# Patient Record
Sex: MALE | Race: WHITE | NOT HISPANIC OR LATINO | Employment: FULL TIME | URBAN - METROPOLITAN AREA
[De-identification: names, ages, dates, MRNs, and addresses within clinical notes are randomized per-mention and may not be internally consistent; named-entity substitution may affect disease eponyms.]

---

## 2023-08-30 ENCOUNTER — HOSPITAL ENCOUNTER (OUTPATIENT)
Dept: DATA CONVERSION | Facility: HOSPITAL | Age: 68
End: 2023-08-30
Attending: ORTHOPAEDIC SURGERY | Admitting: ORTHOPAEDIC SURGERY

## 2023-08-30 DIAGNOSIS — M25.742 OSTEOPHYTE, LEFT HAND: ICD-10-CM

## 2023-08-30 DIAGNOSIS — M67.442 GANGLION, LEFT HAND: ICD-10-CM

## 2023-08-30 DIAGNOSIS — M67.449 GANGLION, UNSPECIFIED HAND: ICD-10-CM

## 2023-09-13 LAB
COMPLETE PATHOLOGY REPORT: NORMAL
CONVERTED CLINICAL DIAGNOSIS-HISTORY: NORMAL
CONVERTED FINAL DIAGNOSIS: NORMAL
CONVERTED FINAL REPORT PDF LINK TO COPY AND PASTE: NORMAL
CONVERTED GROSS DESCRIPTION: NORMAL

## 2023-09-29 VITALS — HEIGHT: 61 IN | WEIGHT: 229.28 LBS | BODY MASS INDEX: 43.29 KG/M2

## 2023-09-30 NOTE — H&P
History & Physical Reviewed:   I have reviewed the History and Physical dated:  21-Aug-2023   History and Physical reviewed and relevant findings noted. Patient examined to review pertinent physical  findings.: No significant changes   Home Medications Reviewed: no changes noted   Allergies Reviewed: no changes noted       ERAS (Enhanced Recovery After Surgery):  ·  ERAS Patient: no     Consent:   COVID-19 Consent:  ·  COVID-19 Risk Consent Surgeon has reviewed key risks related to the risk of philipp COVID-19 and if they contract COVID-19 what the risks are.       Electronic Signatures:  Yves Sanchez)  (Signed 30-Aug-2023 06:52)   Authored: History & Physical Reviewed, ERAS, Consent,  Note Completion      Last Updated: 30-Aug-2023 06:52 by Yves Sanchez)

## 2023-10-01 NOTE — OP NOTE
Post Operative Note:     PreOp Diagnosis: Left index finger mucous cyst   Post-Procedure Diagnosis: Left index finger mucous  cyst   Procedure: Left index finger mucous cyst excision   Surgeon: Daniel   Resident/Fellow/Other Assistant: Kt   Anesthesia: Local   Estimated Blood Loss (mL): Minimal   Specimen: yes. Left index finger mucous cyst   Findings: See below   Patient Returned To/Condition: PACU/good     Operative Report Dictated:  Dictation: not applicable - note contains Operative  Report   Operative Report:    Indications: Patient with left index finger mucous cyst and intermittent drainage.  We discussed risks and benefits of nonoperative versus operative treatment options  and after thoroughly reviewing patient wished to proceed with surgical excision.  We discussed potential for recurrence as well as possible nail deformity along with other risks.    Operative course: Patient was greeted in the preoperative holding area and the operative site was marked with indelible marker.  A digital block was performed after confirming patient identifiers and surgical site.  Block was performed with 4 mL of 1%  lidocaine with epinephrine and sodium bicarbonate using sterile technique.  Patient was brought back to the operating room suite where left upper extremity was prepped and draped in standard sterile fashion.  Timeout procedure was performed as per standard  protocol.  Digital turnicot was applied to the finger.  Longitudinal incision was made directly overlying the mucous cyst.  Dissection was carried down along the mucous cyst stalk which was continuous with the DIP joint.  While protecting the collateral  ligament as well as the terminal extensor tendon the cyst was sharply excised and passed off the field for pathology analysis.  Small osteophytes were debrided with rongeur and wound was then irrigated.  Wound was reapproximated with 4-0 Monocryl in a  simple interrupted fashion.  Dry sterile  dressings were applied and patient was taken to the recovery for further care.    He will follow-up in 2 weeks for wound check and pathology review.     Attestation:   Note Completion:  Attending Attestation I was present for the entire procedure         Electronic Signatures:  Yves Sanchez)  (Signed 30-Aug-2023 09:02)   Authored: Post Operative Note, Note Completion      Last Updated: 30-Aug-2023 09:02 by Yves Sanchez)

## 2024-05-07 ENCOUNTER — APPOINTMENT (OUTPATIENT)
Dept: PRIMARY CARE | Facility: CLINIC | Age: 69
End: 2024-05-07
Payer: COMMERCIAL

## 2025-02-20 ENCOUNTER — TELEMEDICINE (OUTPATIENT)
Dept: PRIMARY CARE | Facility: CLINIC | Age: 70
End: 2025-02-20
Payer: COMMERCIAL

## 2025-02-20 DIAGNOSIS — J40 BRONCHITIS: Primary | ICD-10-CM

## 2025-02-20 RX ORDER — ALBUTEROL SULFATE 90 UG/1
2 INHALANT RESPIRATORY (INHALATION) EVERY 4 HOURS PRN
Qty: 8 G | Refills: 0 | Status: SHIPPED | OUTPATIENT
Start: 2025-02-20 | End: 2026-02-20

## 2025-02-20 NOTE — PROGRESS NOTES
"Subjective   Patient ID: Stanislav Orellana is a 69 y.o. male who presents for Cough.  HPI  Stanislav presents via phone visit for cough  -He was seen at urgent care, put on azithromycin, prednisone, albuterol for bronchitis 2/10/25  -He feels 75% better  -States that he cannot hold breath without coughing, coughing up phelgm every 2-3 hours, phlegm is white/milky, in beginning it was green   -Finished azithromycin 2/16/25  -Cannot take a normal deep breath  -Can feel \"blockage\" in his bronchials   -No sinus pressure   -No wheezing  -No fever   -Just tired   -Using albuterol inhaler did help, did not do it the last couple days, it does help   -Prednisone helped too, does feel better after taking it         Current Outpatient Medications:     albuterol (Ventolin HFA) 90 mcg/actuation inhaler, Inhale 2 puffs every 4 hours if needed for wheezing or shortness of breath., Disp: 8 g, Rfl: 0   Past Surgical History:   Procedure Laterality Date    APPENDECTOMY  09/06/2017    Appendectomy    OTHER SURGICAL HISTORY  09/06/2017    Surgery Epididymis Incision And Drainage    UMBILICAL HERNIA REPAIR  09/06/2017    Umbilical Hernia Repair      Past Medical History:   Diagnosis Date    Acute maxillary sinusitis, unspecified     Maxillary sinusitis, acute    Hydrocele, unspecified 10/12/2020    Hydrocele, right    Hydrocele, unspecified 11/09/2020    Hydrocele of testis    Otitis media, unspecified, bilateral 09/06/2017    Acute otitis media, bilateral    Personal history of other diseases of male genital organs 09/26/2019    History of scrotal mass    Personal history of other diseases of male genital organs 10/18/2017    History of acute prostatitis    Personal history of other diseases of the circulatory system     History of hypertension    Personal history of other diseases of the circulatory system     History of rheumatic fever    Personal history of other diseases of the circulatory system     History of cardiac murmur    Personal " history of other diseases of the digestive system     History of appendicitis    Personal history of other diseases of the digestive system     History of umbilical hernia    Personal history of other diseases of the respiratory system     History of acute bronchitis    Personal history of other diseases of the respiratory system 11/07/2019    History of chronic sinusitis         No family history on file.   Review of Systems  10 point ROS negative except as otherwise noted in the HPI.      Objective   There were no vitals taken for this visit.   Physical Exam    Limited exam due to nature of virtual visit.      Assessment/Plan   Problem List Items Addressed This Visit    None  Visit Diagnoses       Bronchitis    -  Primary    Relevant Medications    albuterol (Ventolin HFA) 90 mcg/actuation inhaler          Bronchitis  Discussed he still has to give azithromycin time to work   Continue albuterol  Recommend trelegy - sample up front for him to   If not better in 3 days then can do doxycycline     Virtual or Telephone Consent    While technically available, the patient was unable or unwilling to consent to connect via audio/video telehealth technology; therefore, I performed this visit using a real-time audio only connection between Stanislav Orellana location: home & Daisy Dykes PA-C location: [facility].  Verbal consent was requested and obtained from Stanislav Orellana on this date, for a telehealth visit.       Discussed at visit any disease processes that were of concern as well as the risks, benefits and instructions on any new medication provided. Patient (and/or caretaker of patient if present) stated all questions were answered, and they voiced understanding of instructions.     Daisy Dykes PA-C